# Patient Record
Sex: FEMALE | Race: WHITE | NOT HISPANIC OR LATINO | Employment: OTHER | URBAN - METROPOLITAN AREA
[De-identification: names, ages, dates, MRNs, and addresses within clinical notes are randomized per-mention and may not be internally consistent; named-entity substitution may affect disease eponyms.]

---

## 2023-08-22 LAB
ERYTHROCYTE DISTRIBUTION WIDTH (RATIO) BY AUTOMATED COUNT: 15.2 % (ref 11.5–14.5)
ERYTHROCYTE MEAN CORPUSCULAR HEMOGLOBIN CONCENTRATION (G/DL) BY AUTOMATED: 28.9 G/DL (ref 32–36)
ERYTHROCYTE MEAN CORPUSCULAR VOLUME (FL) BY AUTOMATED COUNT: 83 FL (ref 80–100)
ERYTHROCYTES (10*6/UL) IN BLOOD BY AUTOMATED COUNT: 4.73 X10E12/L (ref 4–5.2)
HEMATOCRIT (%) IN BLOOD BY AUTOMATED COUNT: 39.4 % (ref 36–46)
HEMOGLOBIN (G/DL) IN BLOOD: 11.4 G/DL (ref 12–16)
LEUKOCYTES (10*3/UL) IN BLOOD BY AUTOMATED COUNT: 13.2 X10E9/L (ref 4.4–11.3)
NRBC (PER 100 WBCS) BY AUTOMATED COUNT: 0 /100 WBC (ref 0–0)
PLATELETS (10*3/UL) IN BLOOD AUTOMATED COUNT: 345 X10E9/L (ref 150–450)

## 2023-09-06 LAB
ALBUMIN (G/DL) IN SER/PLAS: 2.9 G/DL (ref 3.4–5)
ANION GAP IN SER/PLAS: 14 MMOL/L (ref 10–20)
BASOPHILS (10*3/UL) IN BLOOD BY AUTOMATED COUNT: NORMAL
BASOPHILS/100 LEUKOCYTES IN BLOOD BY AUTOMATED COUNT: NORMAL
CALCIUM (MG/DL) IN SER/PLAS: 7.8 MG/DL (ref 8.6–10.3)
CARBON DIOXIDE, TOTAL (MMOL/L) IN SER/PLAS: 23 MMOL/L (ref 21–32)
CHLORIDE (MMOL/L) IN SER/PLAS: 109 MMOL/L (ref 98–107)
CREATININE (MG/DL) IN SER/PLAS: 0.65 MG/DL (ref 0.5–1.05)
EOSINOPHILS (10*3/UL) IN BLOOD BY AUTOMATED COUNT: NORMAL
EOSINOPHILS/100 LEUKOCYTES IN BLOOD BY AUTOMATED COUNT: NORMAL
ERYTHROCYTE DISTRIBUTION WIDTH (RATIO) BY AUTOMATED COUNT: NORMAL
ERYTHROCYTE MEAN CORPUSCULAR HEMOGLOBIN CONCENTRATION (G/DL) BY AUTOMATED: NORMAL
ERYTHROCYTE MEAN CORPUSCULAR VOLUME (FL) BY AUTOMATED COUNT: NORMAL
ERYTHROCYTES (10*6/UL) IN BLOOD BY AUTOMATED COUNT: NORMAL
GFR FEMALE: 81 ML/MIN/1.73M2
GLUCOSE (MG/DL) IN SER/PLAS: 208 MG/DL (ref 74–99)
HEMATOCRIT (%) IN BLOOD BY AUTOMATED COUNT: NORMAL
HEMOGLOBIN (G/DL) IN BLOOD: NORMAL
IMMATURE GRANULOCYTES/100 LEUKOCYTES IN BLOOD BY AUTOMATED COUNT: NORMAL
LEUKOCYTES (10*3/UL) IN BLOOD BY AUTOMATED COUNT: NORMAL
LYMPHOCYTES (10*3/UL) IN BLOOD BY AUTOMATED COUNT: NORMAL
LYMPHOCYTES/100 LEUKOCYTES IN BLOOD BY AUTOMATED COUNT: NORMAL
MANUAL DIFFERENTIAL Y/N: NORMAL
MONOCYTES (10*3/UL) IN BLOOD BY AUTOMATED COUNT: NORMAL
MONOCYTES/100 LEUKOCYTES IN BLOOD BY AUTOMATED COUNT: NORMAL
NEUTROPHILS (10*3/UL) IN BLOOD BY AUTOMATED COUNT: NORMAL
NEUTROPHILS/100 LEUKOCYTES IN BLOOD BY AUTOMATED COUNT: NORMAL
NRBC (PER 100 WBCS) BY AUTOMATED COUNT: NORMAL
PHOSPHATE (MG/DL) IN SER/PLAS: 2.6 MG/DL (ref 2.5–4.9)
PLATELETS (10*3/UL) IN BLOOD AUTOMATED COUNT: NORMAL
POTASSIUM (MMOL/L) IN SER/PLAS: 4 MMOL/L (ref 3.5–5.3)
SODIUM (MMOL/L) IN SER/PLAS: 142 MMOL/L (ref 136–145)
UREA NITROGEN (MG/DL) IN SER/PLAS: 28 MG/DL (ref 6–23)

## 2023-09-10 LAB
ERYTHROCYTE DISTRIBUTION WIDTH (RATIO) BY AUTOMATED COUNT: 15.4 % (ref 11.5–14.5)
ERYTHROCYTE MEAN CORPUSCULAR HEMOGLOBIN CONCENTRATION (G/DL) BY AUTOMATED: 28.8 G/DL (ref 32–36)
ERYTHROCYTE MEAN CORPUSCULAR VOLUME (FL) BY AUTOMATED COUNT: 79 FL (ref 80–100)
ERYTHROCYTES (10*6/UL) IN BLOOD BY AUTOMATED COUNT: 4.57 X10E12/L (ref 4–5.2)
HEMATOCRIT (%) IN BLOOD BY AUTOMATED COUNT: 36.1 % (ref 36–46)
HEMOGLOBIN (G/DL) IN BLOOD: 10.4 G/DL (ref 12–16)
LEUKOCYTES (10*3/UL) IN BLOOD BY AUTOMATED COUNT: 9.7 X10E9/L (ref 4.4–11.3)
PLATELETS (10*3/UL) IN BLOOD AUTOMATED COUNT: 412 X10E9/L (ref 150–450)

## 2023-09-12 ENCOUNTER — DOCUMENTATION (OUTPATIENT)
Dept: GERIATRIC MEDICINE | Facility: CLINIC | Age: 88
End: 2023-09-12
Payer: MEDICARE

## 2023-10-06 DIAGNOSIS — C90.00 MULTIPLE MYELOMA NOT HAVING ACHIEVED REMISSION (MULTI): Primary | ICD-10-CM

## 2023-10-06 PROBLEM — G89.3 NEOPLASM RELATED PAIN: Status: ACTIVE | Noted: 2023-10-06

## 2023-10-06 PROBLEM — R53.81 DEBILITY: Status: ACTIVE | Noted: 2023-10-06

## 2023-10-06 PROBLEM — C44.91 BASAL CELL CARCINOMA (BCC): Status: ACTIVE | Noted: 2023-10-06

## 2023-10-06 PROBLEM — H90.3 BILATERAL SENSORINEURAL HEARING LOSS: Status: ACTIVE | Noted: 2023-10-06

## 2023-10-06 PROBLEM — Z85.3 PERSONAL HISTORY OF MALIGNANT NEOPLASM OF BREAST: Status: ACTIVE | Noted: 2021-08-09

## 2023-10-06 PROBLEM — R53.83 FATIGUE: Status: ACTIVE | Noted: 2023-10-06

## 2023-10-06 PROBLEM — R53.81 PHYSICAL DECONDITIONING: Status: ACTIVE | Noted: 2023-10-06

## 2023-10-06 PROBLEM — E55.9 VITAMIN D DEFICIENCY: Status: ACTIVE | Noted: 2023-10-06

## 2023-10-06 PROBLEM — J38.01 VOCAL CORD PARALYSIS, UNILATERAL COMPLETE: Status: ACTIVE | Noted: 2023-10-06

## 2023-10-06 PROBLEM — I10 BENIGN ESSENTIAL HYPERTENSION: Status: ACTIVE | Noted: 2021-08-09

## 2023-10-06 PROBLEM — M79.2 NEUROPATHIC PAIN: Status: ACTIVE | Noted: 2023-10-06

## 2023-10-06 PROBLEM — F02.80 ALZHEIMER'S DISEASE, UNSPECIFIED (MULTI): Status: ACTIVE | Noted: 2023-10-06

## 2023-10-06 PROBLEM — G30.9 ALZHEIMER'S DISEASE, UNSPECIFIED (MULTI): Status: ACTIVE | Noted: 2023-10-06

## 2023-10-06 PROBLEM — I50.9 CHF (CONGESTIVE HEART FAILURE) (MULTI): Status: ACTIVE | Noted: 2023-10-06

## 2023-10-06 PROBLEM — M81.0 OSTEOPOROSIS: Status: ACTIVE | Noted: 2023-10-06

## 2023-10-06 RX ORDER — FAMOTIDINE 10 MG/ML
20 INJECTION INTRAVENOUS ONCE AS NEEDED
Status: CANCELLED | OUTPATIENT
Start: 2023-10-12

## 2023-10-06 RX ORDER — ACETAMINOPHEN 325 MG/1
650 TABLET ORAL ONCE
Status: CANCELLED | OUTPATIENT
Start: 2023-11-09

## 2023-10-06 RX ORDER — DIPHENHYDRAMINE HCL 50 MG
50 CAPSULE ORAL ONCE
Status: CANCELLED | OUTPATIENT
Start: 2023-10-12

## 2023-10-06 RX ORDER — HEPARIN 100 UNIT/ML
500 SYRINGE INTRAVENOUS AS NEEDED
Status: CANCELLED | OUTPATIENT
Start: 2023-10-06

## 2023-10-06 RX ORDER — FAMOTIDINE 10 MG/ML
20 INJECTION INTRAVENOUS ONCE AS NEEDED
Status: CANCELLED | OUTPATIENT
Start: 2023-11-09

## 2023-10-06 RX ORDER — MONTELUKAST SODIUM 10 MG/1
10 TABLET ORAL ONCE
Status: CANCELLED | OUTPATIENT
Start: 2023-10-12

## 2023-10-06 RX ORDER — ALBUTEROL SULFATE 0.83 MG/ML
3 SOLUTION RESPIRATORY (INHALATION) AS NEEDED
Status: CANCELLED | OUTPATIENT
Start: 2023-11-09

## 2023-10-06 RX ORDER — MONTELUKAST SODIUM 10 MG/1
10 TABLET ORAL ONCE
Status: CANCELLED | OUTPATIENT
Start: 2023-11-09

## 2023-10-06 RX ORDER — ACETAMINOPHEN 325 MG/1
650 TABLET ORAL ONCE
Status: CANCELLED | OUTPATIENT
Start: 2023-10-12

## 2023-10-06 RX ORDER — ALBUTEROL SULFATE 0.83 MG/ML
3 SOLUTION RESPIRATORY (INHALATION) AS NEEDED
Status: CANCELLED | OUTPATIENT
Start: 2023-10-12

## 2023-10-06 RX ORDER — DIPHENHYDRAMINE HYDROCHLORIDE 50 MG/ML
50 INJECTION INTRAMUSCULAR; INTRAVENOUS AS NEEDED
Status: CANCELLED | OUTPATIENT
Start: 2023-10-12

## 2023-10-06 RX ORDER — DIPHENHYDRAMINE HCL 50 MG
50 CAPSULE ORAL ONCE
Status: CANCELLED | OUTPATIENT
Start: 2023-11-09

## 2023-10-06 RX ORDER — EPINEPHRINE 0.3 MG/.3ML
0.3 INJECTION SUBCUTANEOUS EVERY 5 MIN PRN
Status: CANCELLED | OUTPATIENT
Start: 2023-11-09

## 2023-10-06 RX ORDER — EPINEPHRINE 0.3 MG/.3ML
0.3 INJECTION SUBCUTANEOUS EVERY 5 MIN PRN
Status: CANCELLED | OUTPATIENT
Start: 2023-10-12

## 2023-10-06 RX ORDER — DIPHENHYDRAMINE HYDROCHLORIDE 50 MG/ML
50 INJECTION INTRAMUSCULAR; INTRAVENOUS AS NEEDED
Status: CANCELLED | OUTPATIENT
Start: 2023-11-09

## 2023-10-06 RX ORDER — HEPARIN SODIUM,PORCINE/PF 10 UNIT/ML
50 SYRINGE (ML) INTRAVENOUS AS NEEDED
Status: CANCELLED | OUTPATIENT
Start: 2023-10-06

## 2023-10-12 ENCOUNTER — INFUSION (OUTPATIENT)
Dept: HEMATOLOGY/ONCOLOGY | Facility: HOSPITAL | Age: 88
End: 2023-10-12
Payer: MEDICARE

## 2023-10-12 ENCOUNTER — HOME VISIT (OUTPATIENT)
Dept: POST ACUTE CARE | Facility: EXTERNAL LOCATION | Age: 88
End: 2023-10-12

## 2023-10-12 ENCOUNTER — OFFICE VISIT (OUTPATIENT)
Dept: HEMATOLOGY/ONCOLOGY | Facility: HOSPITAL | Age: 88
End: 2023-10-12
Payer: MEDICARE

## 2023-10-12 VITALS
SYSTOLIC BLOOD PRESSURE: 151 MMHG | DIASTOLIC BLOOD PRESSURE: 93 MMHG | OXYGEN SATURATION: 97 % | HEART RATE: 76 BPM | RESPIRATION RATE: 16 BRPM | TEMPERATURE: 98.4 F

## 2023-10-12 DIAGNOSIS — I26.99 PULMONARY EMBOLISM, OTHER, UNSPECIFIED CHRONICITY, UNSPECIFIED WHETHER ACUTE COR PULMONALE PRESENT (MULTI): ICD-10-CM

## 2023-10-12 DIAGNOSIS — R53.81 PHYSICAL DECONDITIONING: ICD-10-CM

## 2023-10-12 DIAGNOSIS — R63.4 WEIGHT LOSS: ICD-10-CM

## 2023-10-12 DIAGNOSIS — I82.90 VENOUS EMBOLISM: ICD-10-CM

## 2023-10-12 DIAGNOSIS — C90.00 MULTIPLE MYELOMA NOT HAVING ACHIEVED REMISSION (MULTI): ICD-10-CM

## 2023-10-12 DIAGNOSIS — Z09 FOLLOW-UP EXAMINATION: ICD-10-CM

## 2023-10-12 DIAGNOSIS — U07.1 COVID-19 VIRUS INFECTION: Primary | ICD-10-CM

## 2023-10-12 DIAGNOSIS — G89.29 OTHER CHRONIC PAIN: ICD-10-CM

## 2023-10-12 DIAGNOSIS — Z79.01 ON APIXABAN THERAPY: ICD-10-CM

## 2023-10-12 DIAGNOSIS — H90.3 BILATERAL SENSORINEURAL HEARING LOSS: ICD-10-CM

## 2023-10-12 LAB
ALBUMIN SERPL BCP-MCNC: 3.5 G/DL (ref 3.4–5)
ALP SERPL-CCNC: 96 U/L (ref 33–136)
ALT SERPL W P-5'-P-CCNC: 10 U/L (ref 7–45)
ANION GAP SERPL CALC-SCNC: 13 MMOL/L (ref 10–20)
AST SERPL W P-5'-P-CCNC: 13 U/L (ref 9–39)
BASOPHILS # BLD AUTO: 0.04 X10*3/UL (ref 0–0.1)
BASOPHILS NFR BLD AUTO: 0.4 %
BILIRUB SERPL-MCNC: 0.4 MG/DL (ref 0–1.2)
BUN SERPL-MCNC: 27 MG/DL (ref 6–23)
CALCIUM SERPL-MCNC: 8.7 MG/DL (ref 8.6–10.6)
CHLORIDE SERPL-SCNC: 110 MMOL/L (ref 98–107)
CO2 SERPL-SCNC: 26 MMOL/L (ref 21–32)
CREAT SERPL-MCNC: 0.47 MG/DL (ref 0.5–1.05)
EOSINOPHIL # BLD AUTO: 0.06 X10*3/UL (ref 0–0.4)
EOSINOPHIL NFR BLD AUTO: 0.6 %
ERYTHROCYTE [DISTWIDTH] IN BLOOD BY AUTOMATED COUNT: 17.5 % (ref 11.5–14.5)
GFR SERPL CREATININE-BSD FRML MDRD: 88 ML/MIN/1.73M*2
GLUCOSE SERPL-MCNC: 114 MG/DL (ref 74–99)
HCT VFR BLD AUTO: 36.9 % (ref 36–46)
HGB BLD-MCNC: 10.4 G/DL (ref 12–16)
IGA SERPL-MCNC: 31 MG/DL (ref 70–400)
IGG SERPL-MCNC: 558 MG/DL (ref 700–1600)
IGM SERPL-MCNC: 26 MG/DL (ref 40–230)
IMM GRANULOCYTES # BLD AUTO: 0.14 X10*3/UL (ref 0–0.5)
IMM GRANULOCYTES NFR BLD AUTO: 1.5 % (ref 0–0.9)
LYMPHOCYTES # BLD AUTO: 2.02 X10*3/UL (ref 0.8–3)
LYMPHOCYTES NFR BLD AUTO: 21.7 %
MCH RBC QN AUTO: 21.6 PG (ref 26–34)
MCHC RBC AUTO-ENTMCNC: 28.2 G/DL (ref 32–36)
MCV RBC AUTO: 77 FL (ref 80–100)
MONOCYTES # BLD AUTO: 0.76 X10*3/UL (ref 0.05–0.8)
MONOCYTES NFR BLD AUTO: 8.2 %
NEUTROPHILS # BLD AUTO: 6.29 X10*3/UL (ref 1.6–5.5)
NEUTROPHILS NFR BLD AUTO: 67.6 %
NRBC BLD-RTO: 0 /100 WBCS (ref 0–0)
PLATELET # BLD AUTO: 329 X10*3/UL (ref 150–450)
PMV BLD AUTO: 11.8 FL (ref 7.5–11.5)
POTASSIUM SERPL-SCNC: 3.5 MMOL/L (ref 3.5–5.3)
PROT SERPL-MCNC: 5.9 G/DL (ref 6.4–8.2)
PROT SERPL-MCNC: 5.9 G/DL (ref 6.4–8.2)
RBC # BLD AUTO: 4.81 X10*6/UL (ref 4–5.2)
SODIUM SERPL-SCNC: 145 MMOL/L (ref 136–145)
WBC # BLD AUTO: 9.3 X10*3/UL (ref 4.4–11.3)

## 2023-10-12 PROCEDURE — 85025 COMPLETE CBC W/AUTO DIFF WBC: CPT | Mod: CMCLAB

## 2023-10-12 PROCEDURE — 2500000004 HC RX 250 GENERAL PHARMACY W/ HCPCS (ALT 636 FOR OP/ED)

## 2023-10-12 PROCEDURE — 84155 ASSAY OF PROTEIN SERUM: CPT | Mod: CMCLAB

## 2023-10-12 PROCEDURE — 96401 CHEMO ANTI-NEOPL SQ/IM: CPT

## 2023-10-12 PROCEDURE — 80053 COMPREHEN METABOLIC PANEL: CPT | Mod: CMCLAB

## 2023-10-12 PROCEDURE — 99349 HOME/RES VST EST MOD MDM 40: CPT | Performed by: NURSE PRACTITIONER

## 2023-10-12 PROCEDURE — 86320 SERUM IMMUNOELECTROPHORESIS: CPT

## 2023-10-12 PROCEDURE — 83521 IG LIGHT CHAINS FREE EACH: CPT | Mod: CMCLAB

## 2023-10-12 PROCEDURE — 99214 OFFICE O/P EST MOD 30 MIN: CPT | Performed by: INTERNAL MEDICINE

## 2023-10-12 PROCEDURE — 3077F SYST BP >= 140 MM HG: CPT | Performed by: INTERNAL MEDICINE

## 2023-10-12 PROCEDURE — 1126F AMNT PAIN NOTED NONE PRSNT: CPT | Performed by: INTERNAL MEDICINE

## 2023-10-12 PROCEDURE — 36415 COLL VENOUS BLD VENIPUNCTURE: CPT | Mod: CMCLAB

## 2023-10-12 PROCEDURE — 1036F TOBACCO NON-USER: CPT | Performed by: INTERNAL MEDICINE

## 2023-10-12 PROCEDURE — 2500000001 HC RX 250 WO HCPCS SELF ADMINISTERED DRUGS (ALT 637 FOR MEDICARE OP)

## 2023-10-12 PROCEDURE — 84165 PROTEIN E-PHORESIS SERUM: CPT

## 2023-10-12 PROCEDURE — 1159F MED LIST DOCD IN RCRD: CPT | Performed by: INTERNAL MEDICINE

## 2023-10-12 PROCEDURE — 82784 ASSAY IGA/IGD/IGG/IGM EACH: CPT | Mod: CMCLAB

## 2023-10-12 PROCEDURE — 3080F DIAST BP >= 90 MM HG: CPT | Performed by: INTERNAL MEDICINE

## 2023-10-12 PROCEDURE — 86334 IMMUNOFIX E-PHORESIS SERUM: CPT

## 2023-10-12 PROCEDURE — 2500000004 HC RX 250 GENERAL PHARMACY W/ HCPCS (ALT 636 FOR OP/ED): Mod: JZ

## 2023-10-12 RX ORDER — DIPHENHYDRAMINE HYDROCHLORIDE 50 MG/ML
50 INJECTION INTRAMUSCULAR; INTRAVENOUS AS NEEDED
Status: DISCONTINUED | OUTPATIENT
Start: 2023-10-12 | End: 2023-10-12 | Stop reason: HOSPADM

## 2023-10-12 RX ORDER — DIPHENHYDRAMINE HCL 50 MG
50 CAPSULE ORAL ONCE
Status: COMPLETED | OUTPATIENT
Start: 2023-10-12 | End: 2023-10-12

## 2023-10-12 RX ORDER — EPINEPHRINE 0.3 MG/.3ML
0.3 INJECTION SUBCUTANEOUS EVERY 5 MIN PRN
Status: DISCONTINUED | OUTPATIENT
Start: 2023-10-12 | End: 2023-10-12 | Stop reason: HOSPADM

## 2023-10-12 RX ORDER — ACETAMINOPHEN 325 MG/1
650 TABLET ORAL ONCE
Status: COMPLETED | OUTPATIENT
Start: 2023-10-12 | End: 2023-10-12

## 2023-10-12 RX ORDER — MONTELUKAST SODIUM 10 MG/1
10 TABLET ORAL ONCE
Status: COMPLETED | OUTPATIENT
Start: 2023-10-12 | End: 2023-10-12

## 2023-10-12 RX ORDER — FAMOTIDINE 10 MG/ML
20 INJECTION INTRAVENOUS ONCE AS NEEDED
Status: DISCONTINUED | OUTPATIENT
Start: 2023-10-12 | End: 2023-10-12 | Stop reason: HOSPADM

## 2023-10-12 RX ORDER — HEPARIN SODIUM,PORCINE/PF 10 UNIT/ML
50 SYRINGE (ML) INTRAVENOUS AS NEEDED
Status: CANCELLED | OUTPATIENT
Start: 2023-10-12

## 2023-10-12 RX ORDER — HEPARIN 100 UNIT/ML
500 SYRINGE INTRAVENOUS AS NEEDED
Status: CANCELLED | OUTPATIENT
Start: 2023-10-12

## 2023-10-12 RX ORDER — ALBUTEROL SULFATE 0.83 MG/ML
3 SOLUTION RESPIRATORY (INHALATION) AS NEEDED
Status: DISCONTINUED | OUTPATIENT
Start: 2023-10-12 | End: 2023-10-12 | Stop reason: HOSPADM

## 2023-10-12 RX ORDER — HEPARIN 100 UNIT/ML
500 SYRINGE INTRAVENOUS AS NEEDED
Status: DISCONTINUED | OUTPATIENT
Start: 2023-10-12 | End: 2023-10-12 | Stop reason: HOSPADM

## 2023-10-12 RX ORDER — HEPARIN SODIUM,PORCINE/PF 10 UNIT/ML
50 SYRINGE (ML) INTRAVENOUS AS NEEDED
Status: DISCONTINUED | OUTPATIENT
Start: 2023-10-12 | End: 2023-10-12 | Stop reason: HOSPADM

## 2023-10-12 RX ADMIN — MONTELUKAST 10 MG: 10 TABLET, FILM COATED ORAL at 17:49

## 2023-10-12 RX ADMIN — ACETAMINOPHEN 650 MG: 325 TABLET ORAL at 17:49

## 2023-10-12 RX ADMIN — DIPHENHYDRAMINE HYDROCHLORIDE 50 MG: 50 CAPSULE ORAL at 17:49

## 2023-10-12 RX ADMIN — DEXAMETHASONE 20 MG: 4 TABLET ORAL at 17:49

## 2023-10-12 RX ADMIN — DARATUMUMAB AND HYALURONIDASE-FIHJ (HUMAN RECOMBINANT) 1800 MG: 1800; 30000 INJECTION SUBCUTANEOUS at 17:34

## 2023-10-12 ASSESSMENT — PAIN SCALES - GENERAL
PAINLEVEL: 0-NO PAIN
PAINLEVEL: 0-NO PAIN

## 2023-10-12 ASSESSMENT — ENCOUNTER SYMPTOMS
OCCASIONAL FEELINGS OF UNSTEADINESS: 1
DEPRESSION: 0
LOSS OF SENSATION IN FEET: 0

## 2023-10-13 LAB
KAPPA LC SERPL-MCNC: 2.91 MG/DL (ref 0.33–1.94)
KAPPA LC/LAMBDA SER: 7.1 {RATIO} (ref 0.26–1.65)
LAMBDA LC SERPL-MCNC: 0.41 MG/DL (ref 0.57–2.63)

## 2023-10-15 ENCOUNTER — PHARMACY VISIT (OUTPATIENT)
Dept: PHARMACY | Facility: CLINIC | Age: 88
End: 2023-10-15
Payer: COMMERCIAL

## 2023-10-15 VITALS
RESPIRATION RATE: 18 BRPM | SYSTOLIC BLOOD PRESSURE: 134 MMHG | HEART RATE: 79 BPM | DIASTOLIC BLOOD PRESSURE: 70 MMHG | OXYGEN SATURATION: 95 % | TEMPERATURE: 98.7 F

## 2023-10-15 PROBLEM — G89.29 OTHER CHRONIC PAIN: Status: ACTIVE | Noted: 2023-10-15

## 2023-10-15 PROBLEM — U07.1 COVID-19 VIRUS INFECTION: Status: ACTIVE | Noted: 2023-10-15

## 2023-10-15 PROBLEM — Z09 FOLLOW-UP EXAMINATION: Status: ACTIVE | Noted: 2023-10-15

## 2023-10-15 PROBLEM — R63.4 WEIGHT LOSS: Status: ACTIVE | Noted: 2023-10-15

## 2023-10-15 PROBLEM — I82.90 VENOUS EMBOLISM: Status: ACTIVE | Noted: 2023-10-15

## 2023-10-15 PROBLEM — Z79.01 ON APIXABAN THERAPY: Status: ACTIVE | Noted: 2023-10-15

## 2023-10-15 PROBLEM — I26.99 PULMONARY EMBOLISM (MULTI): Status: ACTIVE | Noted: 2023-10-15

## 2023-10-15 PROCEDURE — RXMED WILLOW AMBULATORY MEDICATION CHARGE

## 2023-10-15 RX ORDER — FUROSEMIDE 20 MG/1
20 TABLET ORAL DAILY PRN
COMMUNITY

## 2023-10-15 RX ORDER — ACYCLOVIR 200 MG/1
200 CAPSULE ORAL 2 TIMES DAILY
COMMUNITY

## 2023-10-15 RX ORDER — METOPROLOL TARTRATE 25 MG/1
25 TABLET, FILM COATED ORAL 2 TIMES DAILY
COMMUNITY
Start: 2021-08-14

## 2023-10-15 RX ORDER — ACETAMINOPHEN 325 MG/1
325 TABLET ORAL EVERY 4 HOURS PRN
COMMUNITY
Start: 2021-08-14 | End: 2023-12-12

## 2023-10-15 RX ORDER — DENOSUMAB 60 MG/ML
60 INJECTION SUBCUTANEOUS
COMMUNITY
Start: 2019-09-18

## 2023-10-15 RX ORDER — OMEPRAZOLE 20 MG/1
20 CAPSULE, DELAYED RELEASE ORAL
COMMUNITY
Start: 2021-09-19

## 2023-10-15 RX ORDER — DEXAMETHASONE 1 MG/1
1 TABLET ORAL EVERY OTHER DAY
COMMUNITY
Start: 2023-07-22

## 2023-10-15 RX ORDER — DOCUSATE SODIUM 100 MG/1
100 CAPSULE, LIQUID FILLED ORAL 2 TIMES DAILY PRN
COMMUNITY

## 2023-10-15 RX ORDER — SENNOSIDES 8.6 MG/1
1 TABLET ORAL 2 TIMES DAILY
COMMUNITY
Start: 2021-08-09

## 2023-10-15 RX ORDER — SPIRONOLACTONE 25 MG/1
0.5 TABLET ORAL DAILY
COMMUNITY
Start: 2021-08-14

## 2023-10-15 RX ORDER — POLYETHYLENE GLYCOL 3350 17 G/17G
17 POWDER, FOR SOLUTION ORAL DAILY PRN
COMMUNITY
Start: 2021-08-09

## 2023-10-16 NOTE — PROGRESS NOTES
Reason for visit:  Follow up s/p SNF placement for Covid 19 virus infection       Summary Statement:    Summary Statement: Summary Statement: Mrs. Kent is an 89 yo elderly woman with a PMH significant  Covid 19 Virus (8/28/2023) for Multiple Myeloma with metastasis (7/2021) , Fracture of 5th rib, HTN, Heart Murmur, breast cancer (s/p right mastectomy), dysphonia(history of stent placement and injection ? 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      Additional Pertinent Information : 5/7/2023 imaging in ED shows mildly displaced   fracture at the left iliac wing extending through a lytic lesion, calvarial   lesions, and T9 and T11 compression fractures. Trauma and Ortho Surgery   evaluated patient in ED. Trauma rec so further intervention, signed off. Ortho   rec no surgery, WBAT, and outpatient FU upon DC. CT on admit also showing   intraluminal hypodensity at the infrarenal IVC, extending into bilateral common   iliac veins, right external iliac vein and right common femoral vein. Vascular   Med consulted and rec IVC filter over full AC given risk after talking with Son   about fall risk/trauma concerns. Dr. Saenz spoke with son 5/9 and was   originally agreeable to IVC filter. After extensive discussion family has   decided to cancel IVC filter placement and would like to pursue low dose AC   (despite fall risk). Patient started on Eliquis 5 mg BID per vascular surgery on   5/10. DC to SNF 5/12. ____________________________________________________________________________      PSH:  Procedure: Bone Marrow Biopsy ( 7/2021)  bilateral cataract  removal  Abdominal hernia repair  Hysterectomy (1975)  Right salivary gland stone removal  Right mastectomy  1/26/2019- CMN of left intertrochanteric hip fracture  Bunionectomy    HPI: Mrs. Kent is being seen today in her apartment at the [facility] in the presence of her caregiver, Liz, for follow up s/p discharge from SNF.     Patient was hospitalized 5/6-/5/12/2023 after a mechanical fall and an incidental findings of a ,pathological fracture in   5/8/2023:   Previously seen mildly displaced left iliac wing fracture is not      well visualized on this exam. Please see CT abdomen and pelvis dated      05/07/2023 for further evaluation.      2. No additional fracture or malalignment identified.      3. Postsurgical changes of bilateral femoral fixation with no      evidence of hardware failure.      4. Multiple lytic lesions throughout the bilateral iliac wings,      consistent with patient's known multiple myeloma.             5/7/2023 CXR:Severe diffuse osteopenia and innumerable lytic lesions throughout      the visualized osseous structures compatible with known multiple      myeloma. Multilevel degenerative changes of the spine with multiple      compression deformities at the thoracic spine. There are healed right      rib fractures.           6/20/2023: Follow up with Vascular Medicine:     Previously seen mildly displaced left iliac wing fracture is not      well visualized on this exam. Please see CT abdomen and pelvis dated      05/07/2023 for further evaluation.      2. No additional fracture or malalignment identified.      3. Postsurgical changes of bilateral femoral fixation with no      evidence of hardware failure.      4. Multiple lytic lesions throughout the bilateral iliac wings,      consistent with patient's known multiple myeloma.                 Reason for homebound status:  Leaving her home requires a considerable and taxing amount of effort due to generalized weakness/physical  deconditioning secondary to multiple co-morbid conditions.       HPI Mrs. Kent is being seen today in her apartment at the [facility] in the presence of her caregiver , Liz.   In the interim her son called to report that patient was not feeling well.  Patient was admitted to the [facility] on   8/28/2023 and tested positive for the Covid 19 virus.     She was not a candidate for Paxlovid due to she is on Eliquis  Therefore, she was treated with   Remdesivir  on 8/30/203. She did well.     Today she is sitting in her recliner chair with her feet elevated. Smiling.  States she feels fine.    Liz reports patient has been doing well. They are able to maintain there daily routines, and she is preparing to go to her  Heme/Oncology appointment today.     In the interim, she did have the cerumen removed for her ears.      - appetite is good  -Sleeps well  -Wheelchair bound  -No cough, cold or flu-like symptoms   - No falls  -Pain is well controlled on Xtampa  -Moving bowels well  -No dizziness , Chest pain or sob  -no pressure sores , skin tears or ulcers   -Trigeminal nerve pain well controlled on low dose Gabapentin       Interval History: Recent illness, hospitalizations or surgeries - [facility] - 8/28/2023  Current Diet: Regular.   Appetite: good.   Food Consistency: Regular.   Gait/Mobility: Wheelchair.   Bathing: dependent.   Dressing: dependent.   Walking: dependent.   Toileting: dependent.   Feeding: dependent.   Personal Hygiene: dependent.   Bowels: continent.   Bladder: continent.   Managing Finances: dependent.   Shopping: dependent.   Managing Medications: dependent.   Housework / Basic Home Maintenance: dependent.   Laundry: dependent.   Preparing Meals: dependent.    Falls Risk Screening:. LEROY has fallen in the last 6 months.   Advance directives:.-   Patient's DNR Status: DNR-CC Arrest.       Review of Systems  See HPI     Physical Exam    Constitutional    General appearance: does not appear normal. underweight , frail, jovial, NAD.   Head and Face Examination of hair and scalp: Abnormal. alopecia (wearing hairpiece).   Eyes   Inspection of eyes: Sclera and conjunctiva were normal.    Pupil exam: MAYELA. Extraocular movements were intact.   Ears, Nose, Mouth, and Throat   Ears: Normal.    Oropharynx: Normal with moist mucus membranes, tongue midline, no PND, no erythema or enlargement of tonsils.    Hearing: Abnormal.     Lips, teeth, and gums: Normal. Hearing aids.   Neck   Neck Exam: Appearance of the neck was normal. No neck masses observed. No jugular vein distension.   Pulmonary   Respiratory assessment: No respiratory distress, normal respiratory rhythm and effort.    Auscultation of Lungs: Clear bilateral breath sounds. No rales, rhonchi or wheezes.   Cardiovascular   Auscultation of heart: Abnormal.  The heart rate was normal. The rhythm was regular. A grade 2 systolic murmur was heard at the LLSB.    Pedal pulses: Regular rate. 2+ bilaterally.    Exam for edema: No peripheral edema.   Chest   Breast exam: Abnormal.  Both breasts: atrophy.    Chest: Symmetrical and normal appearance.   Abdomen   Abdominal Exam: No bruits normal bowel sounds, soft, non-tender, no abdominal masses palpated.    Liver and Spleen exam: No hepato-splenomegaly.   Genitourinary   Bladder: Normal on palpation.   Lymphatic   Palpation of lymph nodes in axillae: Normal.     Palpation of lymph nodes in neck: No cervical lymphadenopathy.   Musculoskeletal   Range of Motion: Normal movement of all extremities.    Muscle strength/tone: Normal.  TRACY x 4.   Skin   Skin and subcutaneous tissue: Abnormal.  trophic changes to BLE - chronic bruising to UEs and LEs , skin is thin.   Neurologic   Cranial nerves: Abnormal.    Psychiatric   Orientation: Oriented to person, place, and time.    Mood and affect: Normal.         Weights:  10/2/9580=038 lbs  9/19/2023= 110 lbs  8/16/2023= 116  s      Labs reviewed: 9/14/2023        1. COVID-19 virus infection-Follow-up examination  - treated with Remdesivir  -doing well  -reports no complications     2. Venous embolism-. Pulmonary embolism, other, unspecified chronicity, unspecified whether acute cor pulmonale present (CMS/HCC)  . On apixaban therapy    -Extensive emboli at infrarenal IVC extending into bilateral common iliac veins, right external iliac vein and right -common femoral vein  -Previous history of Pulmonary Embolism-- Declined infrarenal IVC  -Continue Apixaban low dose      3. Physical deconditioning  -stable  -total care  -denies feelings of weakness     4. Bilateral sensorineural hearing loss  -cerumen removed in the interim  -continue hearing aids and Pocket Talker Device     5. Multiple myeloma not having achieved remission (CMS/HCC)    -continue Acyclovir , Dexamethasone, and   Oncology Treatment: Daratumumab, 28 Day Cycles - Maintenance           6. Other chronic pain  oxyCODONE myristate (Xtampza ER) 13.5 mg 12 hr abuse-deterrent capsule; TAKE 1 CAPSULE BY MOUTH EVERY 12 HOURS 7    7. Weight loss-  -weight down 6 lbs in in 2 months, in the context of recent Covid 19  -no further weight loss   -continue to monitor       -Stable  -Routine follow up in 2-3 months or sooner if needed

## 2023-10-17 LAB
ALBUMIN: 3.5 G/DL (ref 3.4–5)
ALPHA 1 GLOBULIN: 0.4 G/DL (ref 0.2–0.6)
ALPHA 2 GLOBULIN: 1 G/DL (ref 0.4–1.1)
BETA GLOBULIN: 0.6 G/DL (ref 0.5–1.2)
GAMMA GLOBULIN: 0.5 G/DL (ref 0.5–1.4)
IMMUNOFIXATION COMMENT: ABNORMAL
M-PROTEIN 1: 0.1 G/DL
PATH REVIEW - SERUM IMMUNOFIXATION: ABNORMAL
PATH REVIEW-SERUM PROTEIN ELECTROPHORESIS: ABNORMAL
PROTEIN ELECTROPHORESIS COMMENT: ABNORMAL

## 2023-10-30 ENCOUNTER — TELEPHONE (OUTPATIENT)
Dept: ADMISSION | Facility: HOSPITAL | Age: 88
End: 2023-10-30
Payer: MEDICARE

## 2023-10-30 ENCOUNTER — PHARMACY VISIT (OUTPATIENT)
Dept: PHARMACY | Facility: CLINIC | Age: 88
End: 2023-10-30
Payer: COMMERCIAL

## 2023-10-30 DIAGNOSIS — G89.29 OTHER CHRONIC PAIN: ICD-10-CM

## 2023-10-30 PROCEDURE — RXMED WILLOW AMBULATORY MEDICATION CHARGE

## 2023-10-30 NOTE — TELEPHONE ENCOUNTER
Patient last seen by JENNIFER Pagan on 9/14 with plan to continue Xtampza 13.5mg BID. Follow up visit is not yet scheduled, but an order is in place. OARRS reviewed and no aberrancy noted.  Patient last filled Xtampza 13.5mg #60/30 days on 9/20. Provider to submit prescription to [facility].

## 2023-10-30 NOTE — TELEPHONE ENCOUNTER
Martha Kent called the refill line for Xtampza; [facility] pharmacy. Message sent to Palliative Care team to send in.

## 2023-10-30 NOTE — PROGRESS NOTES
Cancer History:   Treatment Synopsis:    Mrs. Kent is a  89 year old lady that had been in her usual state of health until she had rib and back pain. CT on 6/22 showed: a fracture  of the right fifth anterior rib. There is somewhat of a lytic appearing lesion involving the T7 vertebral body. Subsequent MRI showed innumerable foci of abnormal bone marrow signal of various sizes scattered throughout the visualized osseous structures  including the visualized vertebral bodies and ribs; also at the T6 level, there is near complete abnormal bone marrow signal replacement involving the T6 vertebral body with a component of pathologic collapse and anterior wedging of the T6 vertebral body.  There is approximately 25% loss of height of the T6 vertebral body anteriorly. Importantly, there is mild epidural thickening and enhancement ventrally within the spinal canal at the T6 level raising the possibility of mild epidural extension of neoplasm  versus mild incidental prominence of epidural venous plexus. There was mild overall encroachment upon the spinal canal without thoracic spinal cord deformity.   She had history of sever osteoporosis have been on prolia, had femur fx in the face of prolia several years ago.   She had light chain kappa of around 2, Ig Lambda 3 gr/dL, normal renal function, Ca, Hb: 11.9, total protein 8.2. On review of her total protein in last 1.5 years it is evident that there is a rapid rise in total protein in last 6 months. Although given  sever osteoporosis in past there is possibility of lower disease volume pushing osteoporosis.   Started Daratumumab in 7/2021 which is currently monthly.    History of Present Illness:      ID Statement:    LEROY KENT is a 89 year old Female        Chief Complaint: Myeloma follow up   Interval History:    Patient with Multiple Myeloma IgG lambda on monthly davon and Dex 3 mg every other day.      She is here 10/12/23 with her son and daughter for a  follow up visit.   revocered from recent covid infection. No new c/o     Review of Systems:   Review of Systems:    Pertinent positives and negatives as per history of present illness. Remainder of 10 point review of systems is negative.         Allergies and Intolerances:       Allergies:         procaine: Drug, Unknown, Active     Social Hx: living in as assisted living, lost her  several years ago, worked as her 's  for many years, have four children and good social support, no smoking or illicit drug chucho.   PMHx: Prior Breast Cancer (s/p R Mastectomy, no XRT/Chemo), Dysphonia (?viral etiology, with prior stent placement and injections for vocal cord paralysis), Spinal Stenosis, Osteoporosis (with multiple prior fractures), constipation and Bilateral sensorineural  hearing loss; trigeminal neuralgia   Meds: per chart   Family Hx: non contributory  Allergies: procaine     Vitals and Measurements:   Vitals: Temp: 36.7  HR: 86  RR: 18  BP: 116/55  SPO2%:   99   Measurements: HT(cm): 156.8  WT(kg): 49.8  BSA: 1.47   BMI:  20.2      Physical Exam:      Constitutional: Frail, elderly woman, no distress,  sleeping mostly, awakens to voice, answers questions appropriately.   Eyes: PERRL, EOMI, clear sclera   ENMT: mucous membranes moist, no apparent injury,  no lesions seen   Head/Neck: Neck supple, no apparent injury, thyroid  without mass or tenderness, No JVD, trachea midline, no bruits   Respiratory/Thorax: Patent airways, CTAB, normal  breath sounds with good chest expansion, thorax symmetric   Cardiovascular: Regular, rate and rhythm, no murmurs,  2+ equal pulses of the extremities, normal S 1and S 2   Gastrointestinal: Nondistended, soft, non-tender,  no rebound tenderness or guarding, no masses palpable, no organomegaly, +BS, no bruits   Musculoskeletal: ROM intact, no joint swelling, normal  strength   Extremities: normal extremities, no cyanosis edema,  contusions or wounds, no  clubbing   Neurological: alert and oriented x3, intact senses,  motor, response and reflexes, normal strength   Lymphatic: No significant lymphadenopathy   Psychological: Appropriate mood and behavior   Skin: Warm and dry, no lesions, no rashes       Assessment and Plan:      Assessment and Plan:   Assessment:    [facility] IgG [facility]. No new c/o today     Plan:    Multiple Myeloma: IgG lambda  - on monthly subcutaneous Vandana  - Has been on Dex 4mg every other day for quite some time, taper plan  3mg every other day x1 month, 2mg every other day x1 month, 1mg every day x1 month, stop   - Myeloma markers stable   - cont current therapy and monitor     ID:  - Acyclovir 200mg BID  - Recent bout of COVID, s/p antibiotics and steroids     Peripheral Edema:  - Taking lasix as needed for swelling in her LEs     Supportive Onc:  - Follows with Vernell Pagan CNP  - Xtampza 13.5mg BID, Gabapentin 200mg at bedtime     Cognitive Decline:  - Likely due to age     RTC  One month

## 2023-11-08 ENCOUNTER — APPOINTMENT (OUTPATIENT)
Dept: OTOLARYNGOLOGY | Facility: CLINIC | Age: 88
End: 2023-11-08
Payer: MEDICARE

## 2023-11-09 ENCOUNTER — APPOINTMENT (OUTPATIENT)
Dept: HEMATOLOGY/ONCOLOGY | Facility: HOSPITAL | Age: 88
End: 2023-11-09
Payer: MEDICARE

## 2023-11-09 ENCOUNTER — INFUSION (OUTPATIENT)
Dept: HEMATOLOGY/ONCOLOGY | Facility: HOSPITAL | Age: 88
End: 2023-11-09
Payer: MEDICARE

## 2023-11-09 VITALS
WEIGHT: 108.03 LBS | OXYGEN SATURATION: 98 % | SYSTOLIC BLOOD PRESSURE: 121 MMHG | DIASTOLIC BLOOD PRESSURE: 80 MMHG | RESPIRATION RATE: 18 BRPM | BODY MASS INDEX: 17.98 KG/M2 | TEMPERATURE: 98.8 F | HEART RATE: 91 BPM

## 2023-11-09 DIAGNOSIS — C90.00 MULTIPLE MYELOMA NOT HAVING ACHIEVED REMISSION (MULTI): ICD-10-CM

## 2023-11-09 LAB
ALBUMIN SERPL BCP-MCNC: 3.7 G/DL (ref 3.4–5)
ALP SERPL-CCNC: 93 U/L (ref 33–136)
ALT SERPL W P-5'-P-CCNC: 12 U/L (ref 7–45)
ANION GAP SERPL CALC-SCNC: 15 MMOL/L (ref 10–20)
AST SERPL W P-5'-P-CCNC: 12 U/L (ref 9–39)
BASOPHILS # BLD AUTO: 0.01 X10*3/UL (ref 0–0.1)
BASOPHILS NFR BLD AUTO: 0.1 %
BILIRUB SERPL-MCNC: 0.5 MG/DL (ref 0–1.2)
BUN SERPL-MCNC: 27 MG/DL (ref 6–23)
CALCIUM SERPL-MCNC: 8.8 MG/DL (ref 8.6–10.3)
CHLORIDE SERPL-SCNC: 107 MMOL/L (ref 98–107)
CO2 SERPL-SCNC: 22 MMOL/L (ref 21–32)
CREAT SERPL-MCNC: 0.6 MG/DL (ref 0.5–1.05)
EOSINOPHIL # BLD AUTO: 0.06 X10*3/UL (ref 0–0.4)
EOSINOPHIL NFR BLD AUTO: 0.7 %
ERYTHROCYTE [DISTWIDTH] IN BLOOD BY AUTOMATED COUNT: 18.5 % (ref 11.5–14.5)
GFR SERPL CREATININE-BSD FRML MDRD: 83 ML/MIN/1.73M*2
GLUCOSE SERPL-MCNC: 222 MG/DL (ref 74–99)
HCT VFR BLD AUTO: 35.6 % (ref 36–46)
HGB BLD-MCNC: 10.6 G/DL (ref 12–16)
IGA SERPL-MCNC: 37 MG/DL (ref 70–400)
IGG SERPL-MCNC: 564 MG/DL (ref 700–1600)
IGM SERPL-MCNC: 28 MG/DL (ref 40–230)
IMM GRANULOCYTES # BLD AUTO: 0.05 X10*3/UL (ref 0–0.5)
IMM GRANULOCYTES NFR BLD AUTO: 0.6 % (ref 0–0.9)
LYMPHOCYTES # BLD AUTO: 1.43 X10*3/UL (ref 0.8–3)
LYMPHOCYTES NFR BLD AUTO: 17 %
MCH RBC QN AUTO: 21.5 PG (ref 26–34)
MCHC RBC AUTO-ENTMCNC: 29.8 G/DL (ref 32–36)
MCV RBC AUTO: 72 FL (ref 80–100)
MONOCYTES # BLD AUTO: 0.54 X10*3/UL (ref 0.05–0.8)
MONOCYTES NFR BLD AUTO: 6.4 %
NEUTROPHILS # BLD AUTO: 6.33 X10*3/UL (ref 1.6–5.5)
NEUTROPHILS NFR BLD AUTO: 75.2 %
NRBC BLD-RTO: 0 /100 WBCS (ref 0–0)
PLATELET # BLD AUTO: 317 X10*3/UL (ref 150–450)
POTASSIUM SERPL-SCNC: 3.6 MMOL/L (ref 3.5–5.3)
PROT SERPL-MCNC: 5.7 G/DL (ref 6.4–8.2)
PROT SERPL-MCNC: 6.3 G/DL (ref 6.4–8.2)
RBC # BLD AUTO: 4.93 X10*6/UL (ref 4–5.2)
SODIUM SERPL-SCNC: 140 MMOL/L (ref 136–145)
WBC # BLD AUTO: 8.4 X10*3/UL (ref 4.4–11.3)

## 2023-11-09 PROCEDURE — 2500000004 HC RX 250 GENERAL PHARMACY W/ HCPCS (ALT 636 FOR OP/ED)

## 2023-11-09 PROCEDURE — 86334 IMMUNOFIX E-PHORESIS SERUM: CPT

## 2023-11-09 PROCEDURE — 2500000004 HC RX 250 GENERAL PHARMACY W/ HCPCS (ALT 636 FOR OP/ED): Mod: JZ

## 2023-11-09 PROCEDURE — 85025 COMPLETE CBC W/AUTO DIFF WBC: CPT

## 2023-11-09 PROCEDURE — 86320 SERUM IMMUNOELECTROPHORESIS: CPT

## 2023-11-09 PROCEDURE — 36415 COLL VENOUS BLD VENIPUNCTURE: CPT

## 2023-11-09 PROCEDURE — 84165 PROTEIN E-PHORESIS SERUM: CPT

## 2023-11-09 PROCEDURE — 80053 COMPREHEN METABOLIC PANEL: CPT

## 2023-11-09 PROCEDURE — 2500000001 HC RX 250 WO HCPCS SELF ADMINISTERED DRUGS (ALT 637 FOR MEDICARE OP)

## 2023-11-09 PROCEDURE — 83521 IG LIGHT CHAINS FREE EACH: CPT

## 2023-11-09 PROCEDURE — 82784 ASSAY IGA/IGD/IGG/IGM EACH: CPT

## 2023-11-09 PROCEDURE — 84155 ASSAY OF PROTEIN SERUM: CPT

## 2023-11-09 PROCEDURE — 96401 CHEMO ANTI-NEOPL SQ/IM: CPT

## 2023-11-09 RX ORDER — MONTELUKAST SODIUM 10 MG/1
10 TABLET ORAL ONCE
Status: COMPLETED | OUTPATIENT
Start: 2023-11-09 | End: 2023-11-09

## 2023-11-09 RX ORDER — ACETAMINOPHEN 325 MG/1
650 TABLET ORAL ONCE
Status: COMPLETED | OUTPATIENT
Start: 2023-11-09 | End: 2023-11-09

## 2023-11-09 RX ORDER — DIPHENHYDRAMINE HCL 50 MG
50 CAPSULE ORAL ONCE
Status: COMPLETED | OUTPATIENT
Start: 2023-11-09 | End: 2023-11-09

## 2023-11-09 RX ADMIN — ACETAMINOPHEN 650 MG: 325 TABLET ORAL at 16:19

## 2023-11-09 RX ADMIN — DEXAMETHASONE 20 MG: 6 TABLET ORAL at 16:19

## 2023-11-09 RX ADMIN — DARATUMUMAB AND HYALURONIDASE-FIHJ (HUMAN RECOMBINANT) 1800 MG: 1800; 30000 INJECTION SUBCUTANEOUS at 16:38

## 2023-11-09 RX ADMIN — MONTELUKAST 10 MG: 10 TABLET, FILM COATED ORAL at 16:19

## 2023-11-09 RX ADMIN — DIPHENHYDRAMINE HYDROCHLORIDE 50 MG: 50 CAPSULE ORAL at 16:19

## 2023-11-10 DIAGNOSIS — C90.00 MULTIPLE MYELOMA NOT HAVING ACHIEVED REMISSION (MULTI): Primary | ICD-10-CM

## 2023-11-10 LAB
KAPPA LC SERPL-MCNC: 3.17 MG/DL (ref 0.33–1.94)
KAPPA LC/LAMBDA SER: 8.13 {RATIO} (ref 0.26–1.65)
LAMBDA LC SERPL-MCNC: 0.39 MG/DL (ref 0.57–2.63)

## 2023-11-10 RX ORDER — ALBUTEROL SULFATE 0.83 MG/ML
3 SOLUTION RESPIRATORY (INHALATION) AS NEEDED
Status: CANCELLED | OUTPATIENT
Start: 2024-01-11

## 2023-11-10 RX ORDER — FAMOTIDINE 10 MG/ML
20 INJECTION INTRAVENOUS ONCE AS NEEDED
Status: CANCELLED | OUTPATIENT
Start: 2023-12-14

## 2023-11-10 RX ORDER — DIPHENHYDRAMINE HCL 50 MG
50 CAPSULE ORAL ONCE
Status: CANCELLED | OUTPATIENT
Start: 2024-01-11

## 2023-11-10 RX ORDER — ACETAMINOPHEN 325 MG/1
650 TABLET ORAL ONCE
Status: CANCELLED | OUTPATIENT
Start: 2024-01-11

## 2023-11-10 RX ORDER — DIPHENHYDRAMINE HCL 50 MG
50 CAPSULE ORAL ONCE
Status: CANCELLED | OUTPATIENT
Start: 2023-12-14

## 2023-11-10 RX ORDER — ALBUTEROL SULFATE 0.83 MG/ML
3 SOLUTION RESPIRATORY (INHALATION) AS NEEDED
Status: CANCELLED | OUTPATIENT
Start: 2023-12-14

## 2023-11-10 RX ORDER — DIPHENHYDRAMINE HYDROCHLORIDE 50 MG/ML
50 INJECTION INTRAMUSCULAR; INTRAVENOUS AS NEEDED
Status: CANCELLED | OUTPATIENT
Start: 2023-12-14

## 2023-11-10 RX ORDER — MONTELUKAST SODIUM 10 MG/1
10 TABLET ORAL ONCE
Status: CANCELLED | OUTPATIENT
Start: 2024-01-11

## 2023-11-10 RX ORDER — DEXAMETHASONE 4 MG/1
20 TABLET ORAL ONCE
Status: CANCELLED | OUTPATIENT
Start: 2024-01-11

## 2023-11-10 RX ORDER — EPINEPHRINE 0.3 MG/.3ML
0.3 INJECTION SUBCUTANEOUS EVERY 5 MIN PRN
Status: CANCELLED | OUTPATIENT
Start: 2023-12-14

## 2023-11-10 RX ORDER — DEXAMETHASONE 4 MG/1
20 TABLET ORAL ONCE
Status: CANCELLED | OUTPATIENT
Start: 2023-12-14

## 2023-11-10 RX ORDER — FAMOTIDINE 10 MG/ML
20 INJECTION INTRAVENOUS ONCE AS NEEDED
Status: CANCELLED | OUTPATIENT
Start: 2024-01-11

## 2023-11-10 RX ORDER — ACETAMINOPHEN 325 MG/1
650 TABLET ORAL ONCE
Status: CANCELLED | OUTPATIENT
Start: 2023-12-14

## 2023-11-10 RX ORDER — DIPHENHYDRAMINE HYDROCHLORIDE 50 MG/ML
50 INJECTION INTRAMUSCULAR; INTRAVENOUS AS NEEDED
Status: CANCELLED | OUTPATIENT
Start: 2024-01-11

## 2023-11-10 RX ORDER — EPINEPHRINE 0.3 MG/.3ML
0.3 INJECTION SUBCUTANEOUS EVERY 5 MIN PRN
Status: CANCELLED | OUTPATIENT
Start: 2024-01-11

## 2023-11-10 RX ORDER — MONTELUKAST SODIUM 10 MG/1
10 TABLET ORAL ONCE
Status: CANCELLED | OUTPATIENT
Start: 2023-12-14

## 2023-11-15 LAB
ALBUMIN: 3.3 G/DL (ref 3.4–5)
ALPHA 1 GLOBULIN: 0.3 G/DL (ref 0.2–0.6)
ALPHA 2 GLOBULIN: 1 G/DL (ref 0.4–1.1)
BETA GLOBULIN: 0.6 G/DL (ref 0.5–1.2)
GAMMA GLOBULIN: 0.5 G/DL (ref 0.5–1.4)
IMMUNOFIXATION COMMENT: ABNORMAL
M-PROTEIN 1: 0.1 G/DL
PATH REVIEW - SERUM IMMUNOFIXATION: ABNORMAL
PATH REVIEW-SERUM PROTEIN ELECTROPHORESIS: ABNORMAL
PROTEIN ELECTROPHORESIS COMMENT: ABNORMAL

## 2023-11-30 ENCOUNTER — HOME VISIT (OUTPATIENT)
Dept: GERIATRIC MEDICINE | Facility: CLINIC | Age: 88
End: 2023-11-30
Payer: MEDICARE

## 2023-11-30 DIAGNOSIS — I50.9 ACUTE DECOMPENSATED HEART FAILURE (MULTI): ICD-10-CM

## 2023-11-30 DIAGNOSIS — R05.9 COUGH, UNSPECIFIED TYPE: ICD-10-CM

## 2023-11-30 DIAGNOSIS — D64.9 ANEMIA, UNSPECIFIED TYPE: ICD-10-CM

## 2023-11-30 DIAGNOSIS — R09.02 HYPOXIA: Primary | ICD-10-CM

## 2023-11-30 DIAGNOSIS — Z71.89 GOALS OF CARE, COUNSELING/DISCUSSION: ICD-10-CM

## 2023-11-30 DIAGNOSIS — J18.9 ATYPICAL PNEUMONIA: ICD-10-CM

## 2023-11-30 DIAGNOSIS — J90 PLEURAL EFFUSION: ICD-10-CM

## 2023-11-30 PROCEDURE — 99349 HOME/RES VST EST MOD MDM 40: CPT | Performed by: NURSE PRACTITIONER

## 2023-11-30 ASSESSMENT — PAIN SCALES - GENERAL: PAINLEVEL: 0-NO PAIN

## 2023-12-02 DIAGNOSIS — I50.9 CHRONIC CONGESTIVE HEART FAILURE, UNSPECIFIED HEART FAILURE TYPE (MULTI): Primary | ICD-10-CM

## 2023-12-02 DIAGNOSIS — R05.1 ACUTE COUGH: ICD-10-CM

## 2023-12-04 ENCOUNTER — PHARMACY VISIT (OUTPATIENT)
Dept: PHARMACY | Facility: CLINIC | Age: 88
End: 2023-12-04
Payer: COMMERCIAL

## 2023-12-04 VITALS
DIASTOLIC BLOOD PRESSURE: 82 MMHG | HEART RATE: 108 BPM | RESPIRATION RATE: 22 BRPM | SYSTOLIC BLOOD PRESSURE: 108 MMHG | TEMPERATURE: 99.2 F

## 2023-12-04 PROBLEM — Z71.89 GOALS OF CARE, COUNSELING/DISCUSSION: Status: ACTIVE | Noted: 2023-12-04

## 2023-12-04 PROBLEM — J90 PLEURAL EFFUSION: Status: ACTIVE | Noted: 2023-12-04

## 2023-12-04 PROBLEM — D64.9 ANEMIA: Status: ACTIVE | Noted: 2023-12-04

## 2023-12-04 PROBLEM — J18.9 ATYPICAL PNEUMONIA: Status: ACTIVE | Noted: 2023-12-04

## 2023-12-04 PROBLEM — R05.9 COUGH: Status: ACTIVE | Noted: 2023-12-04

## 2023-12-04 PROBLEM — R09.02 HYPOXIA: Status: ACTIVE | Noted: 2023-12-04

## 2023-12-04 PROCEDURE — RXMED WILLOW AMBULATORY MEDICATION CHARGE

## 2023-12-04 NOTE — PROGRESS NOTES
Reason for visit:  Acute visit for Hypoxia    Summary Statement:    Summary Statement: Summary Statement: Mrs. Kent is an 89 yo elderly woman with a PMH significant  Covid 19 Virus (8/28/2023) for Multiple Myeloma with metastasis (7/2021) , Fracture of 5th rib, HTN, Heart Murmur, breast cancer (s/p right mastectomy), dysphonia(history of stent placement and injection ? for vocal cord paralysis of unknown etiology), spontaneous right femoral fracture (while on Fosamax x 10 years),osteoporosis, bilateral sensorineural hearing loss, chronic non healing lesion to the left foot, alopecia, constipation, Vit D deficiency, osteomyelitis of the right foot x 2 right wrist fracture, basal cell carcinoma, and a remote history of Guillain-[place]an Syndrome (which affected the UE's only).        Additional Pertinent Information : 5/7/2023 imaging in ED shows mildly displaced   fracture at the left iliac wing extending through a lytic lesion, calvarial   lesions, and T9 and T11 compression fractures. Trauma and Ortho Surgery   evaluated patient in ED. Trauma rec so further intervention, signed off. Ortho   rec no surgery, WBAT, and outpatient FU upon DC. CT on admit also showing   intraluminal hypodensity at the infrarenal IVC, extending into bilateral common   iliac veins, right external iliac vein and right common femoral vein. Vascular   Med consulted and rec IVC filter over full AC given risk after talking with Son   about fall risk/trauma concerns. Dr. Saenz spoke with son 5/9 and was   originally agreeable to IVC filter. After extensive discussion family has   decided to cancel IVC filter placement and would like to pursue low dose AC   (despite fall risk). Patient started on Eliquis 5 mg BID per vascular surgery on   5/10. DC to SNF 5/12. ____________________________________________________________________________        PSH:  Procedure: Bone Marrow Biopsy ( 7/2021)  bilateral cataract removal  Abdominal hernia  "repair  Hysterectomy (1975)  Right salivary gland stone removal  Right mastectomy  1/26/2019- CMN of left intertrochanteric hip fracture  Bunionectomy     HPI: Mrs. Kent is being seen today in her apartment at the [facility] in the presence of her caregiver, Yesterday her son communicated the following via email after leaving a message in the office which was not received:     \"My mother Martha Kent is congested with a productive cough.  According to her caregiver Liz Moya, Leigh's sputum has been clear.  Her pO2 was in the low 80s and Liz put her on 2L of O2 per NC (there is an O2 concentrator in the apartment from Leigh's previous illness).  When I saw Leigh yesterday her pO2 on 2L wss still in the mid 80s so I got her up in a chair and increased O2 to 4L per NC.  Her BP has been around 130/80 and her respiratory rate has been running around 18. She has been afebrile.       Today Leigh's cough is more raspy and productive.  I'm hoping that you could stop by to examine Leigh and to consider whether we should put on a course of antibiotics.   Just as an FYI, Leigh may have been exposed to RSV over the weekend (Thanksgiving, small children with colds, one of whom a couple days later had confirmed RSV).  I'm not sure it changes what we can do but we wanted you to be aware.  Thank you.  \" (END OF COMMUNICATION)    Liz stated the symptom of hypoxia was noted on Tuesday when patient was attempting to go to a doctor's appointment which they decided not to take her. POX in the 80's    Patient is being seen today  in the presence of her long time caregiver, Liz.  NP called after received email and placed an order for an STAT CXR PA and Lateral in the morning.   When NP arrived the Tech was there obtaining it.     At the time of visit patient is sitting upright in a recliner chair with her feet elevated. She looks tired, but NAD.  Oxygen via NC was off at the time and PO ws 82%.  She denies " "difficulty breathing.  RR 20-24.  Oxygen was on 6 L NC. Advised to decrease to 4 L NC.  She was found to have a low grade temp at 99.2 F  And , regular.  Reports coughing up clear, white phlegm    Lung sounds with a few coarse crackles in RLL, but otherwise very diminished.     Liz states patient did have wheezing the other day.       After  minutes POX up to 88%  After 30 minutes POX up to 92%  During visit, Liz administered morning medications.  Patient was noted to having coughing with this.  Liz stated this usually does not occur and it is because patient does not like to drink water.   Teaching/education done on aspirating  and how patient's can also aspirate silently.       Patient states she feels good.  She denies chest pain or sob  No dizziness or headaches  Denies pain   Voiding in sufficient quantity   Moving bowels  States patient cannot lie flat in bed   Patient denies any pain   Liz stated at the time of visit that the cough had subsided     Patient states Vascular Medicine recommended weaning off Eliquis 2.5 mg bid- patient has been receiving daily \"most of the time\" per Liz.      A CBCD , CMP , and BNP were ordered    ROS:  See HPI     Physical Exam   Constitutional   General appearance: does not appear normal. underweight , frail, jovial, NAD.   Head and Face Examination of hair and scalp: Abnormal. alopecia (wearing hairpiece).   Eyes   Inspection of eyes: Sclera and conjunctiva were normal.    Pupil exam: MAYELA. Extraocular movements were intact.   Ears, Nose, Mouth, and Throat   Ears: Normal.    Oropharynx: Normal with moist mucus membranes, tongue midline, no PND, no erythema or enlargement of tonsils.    Hearing: Abnormal.     Lips, teeth, and gums: Normal. Hearing aids.   Neck   Neck Exam: Appearance of the neck was normal. No neck masses observed. No jugular vein distension.   Pulmonary   Respiratory assessment: No respiratory distress, respiratory rhythm 20-24  " and  minimal effort.    Auscultation of Lungs: coarse crackles RLL. Otherwise diminished throughout  Cardiovascular   Auscultation of heart: Abnormal.  The heart rate slightly tachycardic at 108 BPM  The rhythm was regular. A grade 2 systolic murmur was heard at the LLSB.    Exam for edema: No peripheral edema.   Chest   Breast exam: deferred  Chest: deferred  Abdomen   Abdominal Exam: No bruits normal bowel sounds, soft, non-tender, no abdominal masses palpated.    Liver and Spleen exam: No hepato-splenomegaly.   Genitourinary   Bladder: Normal on palpation.   Lymphatic   Palpation of lymph nodes in axillae: Normal.     Palpation of lymph nodes in neck: No cervical lymphadenopathy.   Musculoskeletal   Range of Motion: Normal movement of all extremities.    Muscle strength/tone: Normal.  TRACY x 4.   Skin   Skin and subcutaneous tissue: Abnormal.  trophic changes to BLE -   Neurologic   Cranial nerves: Abnormal.    Psychiatric   Orientation: Oriented to person, place, and time.    Mood and affect: Normal.     -Lab Results Reviewed for 11/9/2023  -11/30/2023 at 9:40 PM NP was notified of BNP .2  -CXR PA and Lateral 11/30/2023:  [facility]    Comparison 9/7/2023    Results: unchanged cardiomegaly is seen  The central pulmonary vessel are unchanged.  The aorta is unchanged    The lungs are normally expanded. There are new right and interval improving left patchy/veiling basilar air space opacities with blunted costophrenic angles. No pneumothorax.     Degenerative changes are again present. Chronic right rib fracture deformities are again noted.  Conclusion: Unchanged cardiomegaly with mild pulmonary edema.    Interval progressed right interval improving left pleural effusion and infiltrate/atelectasis.    1. Hypoxia/Acute decompensated heart failure /Cough, unspecified type/Atypical pneumonia/Pleural effusion  -discussed results with collaborating MD Dr. Ochoa  -We agree symptoms are  "consistent with ADHF and atypical pneumonia  -Treatment plan as follows  -STAT Albuterol/Ipratropium aerosols x 1 then q 4 hours while awake x 3 days for aggressive pulmonary toileting  -Azithromycin 250 mg ( 2 tabs) x 1 now, then 250 mg (1 tab) days 2-5  -Furosemide 20 mg daily x 2 days (started 12/1/2023 after BNP results were obtained)  -Continue Oxygen 4-6 LNC to titrate POX to greater than or equal to 92%.  -See Lab/Results section of this note regarding pleural effusions   -Continue to monitor        2. Anemia, unspecified type  -Stable at 10.6  -consistent with iron deficiency with MCV 72  -have discussed in the past- pt would prefer not to take iron supplements       3 Goals of care, counseling/discussion  -Discussed with son,  Boone Kent  -Acknowledge that patient would meet criteria for Hospice Care as goals are  No ED visits, hospitalizations, and would even prefer to avoid an admission to the  Health Center-unless it is going to help her  -He would prefer not to introduce the topic of Hospice with patient due to  He feels that she would give up as \"she tries to please people\" if he brought it up.   -He has requested for NP to remain on as PCP if/when Hospice Care is initiated.   -NP agrees with goals of care          - Continue to monitor  -NP will follow up prn     "

## 2023-12-07 ENCOUNTER — APPOINTMENT (OUTPATIENT)
Dept: HEMATOLOGY/ONCOLOGY | Facility: HOSPITAL | Age: 88
End: 2023-12-07
Payer: MEDICARE

## 2023-12-08 ENCOUNTER — TELEPHONE (OUTPATIENT)
Dept: GERIATRIC MEDICINE | Facility: CLINIC | Age: 88
End: 2023-12-08
Payer: MEDICARE

## 2023-12-08 NOTE — TELEPHONE ENCOUNTER
Nurse from [facility] left a message on the nurse line stating Pt is requesting an oxygen tank with . Message left on the nurse voicemail to call back.

## 2023-12-12 ENCOUNTER — TELEPHONE (OUTPATIENT)
Dept: GERIATRIC MEDICINE | Facility: CLINIC | Age: 88
End: 2023-12-12
Payer: MEDICARE

## 2023-12-12 DIAGNOSIS — R45.1 TERMINAL RESTLESSNESS: ICD-10-CM

## 2023-12-12 DIAGNOSIS — R05.1 ACUTE COUGH: ICD-10-CM

## 2023-12-12 DIAGNOSIS — I50.9 ACUTE DECOMPENSATED HEART FAILURE (MULTI): ICD-10-CM

## 2023-12-12 DIAGNOSIS — G89.3 NEOPLASM RELATED PAIN: Primary | ICD-10-CM

## 2023-12-12 RX ORDER — ACETAMINOPHEN 650 MG/1
650 SUPPOSITORY RECTAL EVERY 6 HOURS PRN
Qty: 12 SUPPOSITORY | Refills: 0 | Status: SHIPPED | OUTPATIENT
Start: 2023-12-12 | End: 2023-12-22

## 2023-12-12 RX ORDER — HYOSCYAMINE SULFATE 0.12 MG/1
0.12 TABLET, ORALLY DISINTEGRATING ORAL EVERY 6 HOURS PRN
Qty: 30 TABLET | Refills: 0 | Status: SHIPPED | OUTPATIENT
Start: 2023-12-12 | End: 2024-01-09

## 2023-12-12 RX ORDER — MORPHINE SULFATE 20 MG/ML
6 SOLUTION ORAL EVERY 2 HOUR PRN
Qty: 30 ML | Refills: 0 | Status: SHIPPED | OUTPATIENT
Start: 2023-12-12 | End: 2024-01-09

## 2023-12-12 RX ORDER — LORAZEPAM 0.5 MG/1
0.5 TABLET ORAL EVERY 4 HOURS PRN
Qty: 10 TABLET | Refills: 0 | Status: SHIPPED | OUTPATIENT
Start: 2023-12-12 | End: 2024-01-09

## 2023-12-13 NOTE — TELEPHONE ENCOUNTER
Spoke with pt's son Boone this evening. Pt is declining. Is mostly nonresponsive. He thinks she is actively transitioning.   Son asked about IV fluids because she clenches down on the water sponge. Discussed that this is not going to provide any comfort for her at this point. Recommended against it. Son ok with that.   She is having RR of 20-30. Hrs and Bps mostly ok at this point. No fevers  Will start morphine 20mg/ml- 0.25mL (5mg) q2H PRN pain/sob, ativan 0.5mg q4H PRN anxiety/restlessness, levsin 0.125 sublingual tab- 1 tab q6H PRN excess secretions, and tylenol 650mg suppository- 1 suppository q6H PRN fever, pain.   Will make referral to [facility] hosp[facility].

## 2023-12-14 ENCOUNTER — APPOINTMENT (OUTPATIENT)
Dept: HEMATOLOGY/ONCOLOGY | Facility: HOSPITAL | Age: 88
End: 2023-12-14
Payer: MEDICARE

## 2023-12-19 ENCOUNTER — APPOINTMENT (OUTPATIENT)
Dept: CARDIOLOGY | Facility: HOSPITAL | Age: 88
End: 2023-12-19
Payer: MEDICARE

## 2024-01-11 ENCOUNTER — APPOINTMENT (OUTPATIENT)
Dept: HEMATOLOGY/ONCOLOGY | Facility: HOSPITAL | Age: 89
End: 2024-01-11
Payer: MEDICARE

## 2024-01-12 ENCOUNTER — APPOINTMENT (OUTPATIENT)
Dept: DERMATOLOGY | Facility: CLINIC | Age: 89
End: 2024-01-12
Payer: MEDICARE

## 2024-03-05 DIAGNOSIS — I50.9 HEART FAILURE, UNSPECIFIED (MULTI): ICD-10-CM

## 2024-03-05 RX ORDER — SPIRONOLACTONE 25 MG/1
12.5 TABLET ORAL DAILY
Qty: 45 TABLET | Refills: 7 | OUTPATIENT
Start: 2024-03-05